# Patient Record
Sex: MALE | Race: WHITE | Employment: FULL TIME | URBAN - METROPOLITAN AREA
[De-identification: names, ages, dates, MRNs, and addresses within clinical notes are randomized per-mention and may not be internally consistent; named-entity substitution may affect disease eponyms.]

---

## 2017-01-11 ENCOUNTER — OFFICE VISIT (OUTPATIENT)
Dept: INTERNAL MEDICINE CLINIC | Age: 22
End: 2017-01-11

## 2017-01-11 VITALS
HEIGHT: 73 IN | WEIGHT: 145 LBS | RESPIRATION RATE: 16 BRPM | BODY MASS INDEX: 19.22 KG/M2 | SYSTOLIC BLOOD PRESSURE: 130 MMHG | OXYGEN SATURATION: 97 % | DIASTOLIC BLOOD PRESSURE: 70 MMHG | HEART RATE: 87 BPM | TEMPERATURE: 99.1 F

## 2017-01-11 DIAGNOSIS — Z88.0 HISTORY OF PENICILLIN ALLERGY: Primary | ICD-10-CM

## 2017-01-11 NOTE — PROGRESS NOTES
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 Have you been to the ER, urgent care clinic since your last visit? Hospitalized since your last visit? No    2. Have you seen or consulted any other health care providers outside of the [address] [place] [address] since your last visit? Include any pap smears or colon screening. No  \"Reviewed record in preparation for visit and have obtained necessary documentation. \"

## 2017-01-11 NOTE — PROGRESS NOTES
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 year old male who presents for evaluation. History of ? PCN allergy. [employer] needs to find out type of reaction. No history of seizures, fainting or heart disease. FH:  Father may have allergy to PCN. Historical Data    Past Medical History   Diagnosis Date    Shoulder dislocation      history of left        Past Surgical History   Procedure Laterality Date    Hx tonsil and adenoidectomy      Hx tonsillectomy  aug 2010    Hx adenoidectomy      Hx orthopaedic       wrist    Hx shoulder arthroscopy Left November 2014        Outpatient Encounter Prescriptions as of 1/11/2017   Medication Sig Dispense Refill    [DISCONTINUED] cefdinir (OMNICEF) 300 mg capsule Take 1 Cap by mouth two (2) times a day. 20 Cap 0    [DISCONTINUED] fluticasone (FLONASE) 50 mcg/actuation nasal spray 2 Sprays by Both Nostrils route daily. 1 Bottle 0    [DISCONTINUED] albuterol (PROVENTIL HFA, VENTOLIN HFA, PROAIR HFA) 90 mcg/actuation inhaler Take 2 Puffs by inhalation every six (6) hours as needed for Wheezing. 1 Inhaler 0    [DISCONTINUED] methylPREDNISolone (MEDROL, FREDRICK,) 4 mg tablet As directed 1 Dose Pack 0    [DISCONTINUED] promethazine-dextromethorphan (PROMETHAZINE-DM) 6.25-15 mg/5 mL syrup Take 5 mL by mouth nightly as needed for Cough. 100 mL 0     No facility-administered encounter medications on file as of 1/11/2017. Allergies   Allergen Reactions    Penicillins Rash        Social History     Social History    Marital status: SINGLE     Spouse name: N/A    Number of children: N/A    Years of education: N/A     Occupational History    Not on file.      Social History Main Topics    Smoking status: Current Every Day Smoker     Packs/day: 0.50    Smokeless tobacco: Former User    Alcohol use Yes      Comment: minimal    Drug use: Yes     Special: Marijuana    Sexual activity: Yes     Partners: Female     Other Topics Concern    Not on file     Social History Narrative        Review of Systems  Pertinent items are noted in HPI. Objective:     Vitals:    01/11/17 1439   BP: 130/70   Pulse: 87   Resp: 16   Temp: 99.1 °F (37.3 °C)   TempSrc: Oral   SpO2: 97%   Weight: 145 lb (65.8 kg)   Height: 6' 1\" (1.854 m)     Pleasant WM in no acute distress. ASSESSMENT / PLAN:   1. History of penicillin allergy  · Unclear of specificity or severity. · Refer to Allergist.  · Patient given contact information. See Allergist for testing. Follow-up Disposition:  Return if symptoms worsen or fail to improve. Advised him to call back or return to office if symptoms worsen/change/persist.  Discussed expected course/resolution/complications of diagnosis in detail with patient. Medication risks/benefits/costs/interactions/alternatives discussed with patient. He was given an after visit summary which includes diagnoses, current medications, & vitals. He expressed understanding with the diagnosis and plan.